# Patient Record
Sex: MALE | NOT HISPANIC OR LATINO | URBAN - METROPOLITAN AREA
[De-identification: names, ages, dates, MRNs, and addresses within clinical notes are randomized per-mention and may not be internally consistent; named-entity substitution may affect disease eponyms.]

---

## 2024-01-20 ENCOUNTER — TRANSFERRED RECORDS (OUTPATIENT)
Dept: HEALTH INFORMATION MANAGEMENT | Facility: CLINIC | Age: 69
End: 2024-01-20

## 2024-04-10 ENCOUNTER — TRANSFERRED RECORDS (OUTPATIENT)
Dept: HEALTH INFORMATION MANAGEMENT | Facility: CLINIC | Age: 69
End: 2024-04-10

## 2024-09-03 ENCOUNTER — TRANSFERRED RECORDS (OUTPATIENT)
Dept: HEALTH INFORMATION MANAGEMENT | Facility: CLINIC | Age: 69
End: 2024-09-03

## 2024-09-04 ENCOUNTER — TRANSCRIBE ORDERS (OUTPATIENT)
Dept: OTHER | Age: 69
End: 2024-09-04

## 2024-09-04 DIAGNOSIS — I50.20 UNSPECIFIED SYSTOLIC (CONGESTIVE) HEART FAILURE (H): Primary | ICD-10-CM

## 2024-09-05 ENCOUNTER — TELEPHONE (OUTPATIENT)
Dept: CARDIOLOGY | Facility: CLINIC | Age: 69
End: 2024-09-05
Payer: MEDICARE

## 2024-09-05 DIAGNOSIS — I42.8 NONISCHEMIC CARDIOMYOPATHY (H): ICD-10-CM

## 2024-09-05 DIAGNOSIS — I26.94 MULTIPLE SUBSEGMENTAL PULMONARY EMBOLI WITHOUT ACUTE COR PULMONALE (H): ICD-10-CM

## 2024-09-05 DIAGNOSIS — I50.22 CHRONIC SYSTOLIC CONGESTIVE HEART FAILURE (H): Primary | ICD-10-CM

## 2024-09-05 DIAGNOSIS — I47.29 NONSUSTAINED VENTRICULAR TACHYCARDIA (H): ICD-10-CM

## 2024-09-05 DIAGNOSIS — Z99.81 DEPENDENCE ON SUPPLEMENTAL OXYGEN: ICD-10-CM

## 2024-09-05 NOTE — TELEPHONE ENCOUNTER
[facility] Call [facility]    Phone Message    May a detailed message be left on voicemail: yes     Reason for Call: Appointment Intake    Referring Provider Name: VALENTINA FLOWER   Diagnosis and/or Symptoms:     Unspecified systolic (congestive) heart failure (H) [I50.20]     NEW HF. Please assist patient.     Action Taken: Message routed to:  [facility] Cardio    Travel Screening: Not Applicable     Date of Service:

## 2024-09-05 NOTE — TELEPHONE ENCOUNTER
9/5/24 - Received referral from [facility] in [place].  As [facility] is not on [facility], will have records scanned into [facility].    9/9/24 - Reviewed records.  Pt lives in [place] but travels to [place] frequently as he works for [employer] in [employer].  Approximately one year ago pt was diagnosed with dilated cardiomyopathy, pulmonary embolism. Latest EF < 30% (9/3/24).  Pt is being referred for advanced options.    9/10/24 - Called/LVM  9/12/24 - Called/LVM

## 2024-09-09 PROBLEM — I47.29 NONSUSTAINED VENTRICULAR TACHYCARDIA (H): Status: ACTIVE | Noted: 2024-09-09

## 2024-09-09 PROBLEM — I26.94 MULTIPLE SUBSEGMENTAL PULMONARY EMBOLI WITHOUT ACUTE COR PULMONALE (H): Status: ACTIVE | Noted: 2024-09-09

## 2024-09-09 PROBLEM — I42.8 NONISCHEMIC CARDIOMYOPATHY (H): Status: ACTIVE | Noted: 2024-09-09

## 2024-09-09 PROBLEM — I50.22 CHRONIC SYSTOLIC CONGESTIVE HEART FAILURE (H): Status: ACTIVE | Noted: 2024-09-09

## 2024-09-09 PROBLEM — Z99.81 DEPENDENCE ON SUPPLEMENTAL OXYGEN: Status: ACTIVE | Noted: 2024-09-09

## 2024-09-09 RX ORDER — HYDROCODONE BITARTRATE AND ACETAMINOPHEN 5; 325 MG/1; MG/1
1 TABLET ORAL EVERY 8 HOURS PRN
COMMUNITY

## 2024-09-09 RX ORDER — ZOLPIDEM TARTRATE 5 MG/1
5 TABLET ORAL
COMMUNITY

## 2024-09-09 RX ORDER — CARVEDILOL 6.25 MG/1
6.25 TABLET ORAL 2 TIMES DAILY WITH MEALS
COMMUNITY

## 2024-09-09 RX ORDER — FUROSEMIDE 40 MG
40 TABLET ORAL DAILY
COMMUNITY

## 2024-09-09 RX ORDER — SPIRONOLACTONE 25 MG/1
12.5 TABLET ORAL DAILY
COMMUNITY

## 2024-09-09 RX ORDER — LOSARTAN POTASSIUM 25 MG/1
25 TABLET ORAL DAILY
COMMUNITY

## 2024-09-19 NOTE — TELEPHONE ENCOUNTER
September 19, 2024 - called patient, no answer, left voicemail. Closing referral but will reopen if patient calls back

## 2024-09-30 ENCOUNTER — CARE COORDINATION (OUTPATIENT)
Dept: CARDIOLOGY | Facility: CLINIC | Age: 69
End: 2024-09-30
Payer: MEDICARE

## 2024-09-30 ENCOUNTER — TELEPHONE (OUTPATIENT)
Dept: CARDIOLOGY | Facility: CLINIC | Age: 69
End: 2024-09-30
Payer: MEDICARE

## 2024-09-30 NOTE — TELEPHONE ENCOUNTER
This patient called and left a voicemail stating he was calling back to schedule his new HF referral.    Zonia Calvillo  Clinic    Pulmonary Hypertension   [facility]  (p) [phone]

## 2024-10-08 NOTE — PROGRESS NOTES
Received referral from [facility] in [place].  As [facility] is not on [facility], will have records scanned into [employer].     Reviewed records. Pt lives in [place] but travels to [place] frequently as he works for [employer] in [employer].  Approximately one year ago pt was diagnosed with dilated cardiomyopathy, pulmonary embolism. Latest EF < 30% (9/3/24).  Pt is being referred for advanced options.    Spoke with patient.He had not returned our calls as he recently moved in [place] and found that he has a lot of dropped calls and did not get the message. He is interested in seeing HF provider  Offered appt with Dr. Lala early November.      I will send his records to scanning but will also email them to Dr Lala prior to his visit.

## 2024-11-04 DIAGNOSIS — I21.A1 MYOCARDIAL INFARCTION TYPE 2 (H): ICD-10-CM

## 2024-11-04 DIAGNOSIS — I42.8 NICM (NONISCHEMIC CARDIOMYOPATHY) (H): ICD-10-CM

## 2024-11-04 DIAGNOSIS — I50.20 HEART FAILURE WITH REDUCED EJECTION FRACTION, NYHA CLASS III (H): Primary | ICD-10-CM

## 2024-11-04 DIAGNOSIS — I47.20 VENTRICULAR TACHYCARDIA, UNSPECIFIED (H): ICD-10-CM

## 2024-11-05 ENCOUNTER — CARE COORDINATION (OUTPATIENT)
Dept: CARDIOLOGY | Facility: CLINIC | Age: 69
End: 2024-11-05
Payer: MEDICARE

## 2024-11-05 NOTE — PROGRESS NOTES
Pt called in today.  He has a 1:00 appt with Dr. Lala.  He came down from [place] and arrived at daughter's home but needed to go to the ER with his grandson.  As the patient will be having knee surgery early in December, he elected to postpone his appt with us until early January.

## 2025-01-14 ENCOUNTER — CARE COORDINATION (OUTPATIENT)
Dept: CARDIOLOGY | Facility: CLINIC | Age: 70
End: 2025-01-14

## 2025-01-14 NOTE — PROGRESS NOTES
Patient was initially referred by [facility] [place] in Sept/2024 for advanced HF options. He was scheduled for consult Dec/2024 but needed to cancel that appt due to his schedule conflict.  He was scheduled again in Jan/2025 but forgot about the appt and didn't show up for the appt. He reported he's had a lot medical appts done and lost track of his appt information.    1/14/25 - Called/LVM to call us to reschedule.  1/15/25 - Pt called back.  Offered appt in 3 weeks but he needs an appt later in the month.  Offered appt with Dr. Pablo on 2/19.      Records are scanned as [facility] is not on [facility]. Pt lives in [place] but travels to [place] frequently as he works for [employer] in [employer]. Approximately one year ago pt was diagnosed with dilated cardiomyopathy, pulmonary embolism. Latest EF < 30% (9/3/24).

## 2025-02-17 DIAGNOSIS — I50.22 CHRONIC SYSTOLIC CONGESTIVE HEART FAILURE (H): Primary | ICD-10-CM

## 2025-02-18 NOTE — PROGRESS NOTES
Advanced Heart Failure/Transplant Clinic Note    HPI  Dear colleagues,     I had the pleasure of seeing Mr. Yoni Bruner in the Cardiology clinic.  As you know, Mr. Yoni Bruner is a pleasant 69 year old male with a past medical history of chronic HFrEF 2/2 NICM, PE, and NSVT who presents as new referral for HFrEF.    Mr Bruner is a 69 year old gentleman from [place] who had no significant medical history until January 2024. He began feeling short of breath in December 2023 and was eventually diagnosed with a pulmonary embolism in January of 2024. He had an echocardigram done at that time which showed an LVEF of 30%. He did undergo cardiac catheterization which showed non-obstructive disease. He was put on GDMT and his echocardiogram was repeated in September 2024. Unfortunately his LVEF had declined further. He had an ICD placed and was referred to us for advanced therapies evaluation.     From a functional standpoint, he is NYHA Class II. He denies SOB on exertion, PND, orthopnea and pedal edema. He lives on his own and is able to carry out all his ADLs. His limitation to his exercise tolerance was arthritis of his right knee but he has undergone TKR in December and is recovering well. He is very active and regularly does work around his house and in his garden. He does not check his blood pressures at home but denies any light-headedness or syncope. His ICD has never shocked him. He has 4 daughters who live in the [facility]. He has not had genetic testing done as yet.      ROS:  A complete 12-point ROS was negative except as above.    PAST MEDICAL HISTORY:  Patient Active Problem List   Diagnosis    Chronic systolic congestive heart failure (H)    Multiple subsegmental pulmonary emboli without acute cor pulmonale (H)    Nonsustained ventricular tachycardia (H)    Dependence on supplemental oxygen    Nonischemic cardiomyopathy (H)      FAMILY HISTORY:  No family history on file.    SOCIAL HISTORY:  No  smoking, no alcohol or recreational drug use.   Social History     Socioeconomic History    Marital status: Single     Spouse name: Not on file    Number of children: Not on file    Years of education: Not on file    Highest education level: Not on file   Occupational History    Not on file   Tobacco Use    Smoking status: Former     Types: Cigarettes    Smokeless tobacco: Never    Tobacco comments:     Some in college, per ptStacie Hightower, EMT on 2/19/2025 at 1:18 PM    Substance and Sexual Activity    Alcohol use: Not on file    Drug use: Not on file    Sexual activity: Not on file   Other Topics Concern    Not on file   Social History Narrative    Not on file     Social Drivers of Health     Financial Resource Strain: Medium Risk (3/8/2021)    Received from [facility]    Overall Financial Resource Strain (CARDIA)     Difficulty of Paying Living Expenses: Somewhat hard   Food Insecurity: No Food Insecurity (3/8/2021)    Received from [facility]    Hunger Vital Sign     Worried About Running Out of Food in the Last Year: Never true     Ran Out of Food in the Last Year: Never true   Transportation Needs: No Transportation Needs (3/8/2021)    Received from [facility] and [facility]    PRAPARE - Transportation     Lack of Transportation (Medical): No     Lack of Transportation (Non-Medical): No   Physical Activity: Not on file   Stress: Not on file   Social Connections: Not on file   Interpersonal Safety: Not on file   Housing Stability: Not on file       ALLERGIES:  No Known Allergies    CURRENT MEDICATIONS:  Current Outpatient Medications   Medication Sig Dispense Refill    apixaban ANTICOAGULANT (ELIQUIS) 5 MG tablet Take 5 mg by mouth 2 times daily.      carvedilol (COREG) 6.25 MG tablet Take 6.25 mg by mouth 2 times daily (with meals).      empagliflozin (JARDIANCE) 10 MG TABS tablet Take 10 mg by mouth daily.       "furosemide (LASIX) 40 MG tablet Take 40 mg by mouth daily.      HYDROcodone-acetaminophen (NORCO) 5-325 MG tablet Take 1 tablet by mouth every 8 hours as needed for moderate to severe pain.      spironolactone (ALDACTONE) 25 MG tablet Take 12.5 mg by mouth daily.      zolpidem (AMBIEN) 5 MG tablet Take 5 mg by mouth nightly as needed for sleep.      losartan (COZAAR) 25 MG tablet Take 2 tablets (50 mg) by mouth daily. 60 tablet 1       EXAM:  /80 (BP Location: Right arm, Patient Position: Chair, Cuff Size: Adult Regular)   Pulse 92   Wt 66.2 kg (145 lb 14.4 oz)   SpO2 99%     General: appears comfortable, alert and interactive, in no acute distress  Head: normocephalic, atraumatic  Eyes: anicteric sclera, EOMI  Mouth: MMM  Neck: supple, no cervical adenopathy  CV: regular rate and rhythm, no murmur, gallop, rub, estimated JVP ~7-8 cm  Resp: clear, no rales or wheezing  GI: soft, nontender, nondistended  Extremities: warm, no peripheral edema, 2+ bilateral radial pulses  Neurological: alert and oriented, no focal deficits  Psych: normal mood and affect  Derm: no rashes on exposed surfaces    Weight  Wt Readings from Last 10 Encounters:   02/19/25 66.2 kg (145 lb 14.4 oz)       I personally reviewed recent labs and data as below and discussed the results with the patient in clinic today.  Labs:  CBC RESULTS:  No results found for: \"WBC\", \"RBC\", \"HGB\", \"HCT\", \"MCV\", \"MCH\", \"MCHC\", \"RDW\", \"PLT\"    CMP RESULTS:  Lab Results   Component Value Date     02/19/2025    POTASSIUM 4.0 02/19/2025    CHLORIDE 106 02/19/2025    CO2 28 02/19/2025    ANIONGAP 7 02/19/2025     (H) 02/19/2025    BUN 10.5 02/19/2025    CR 0.98 02/19/2025    GFRESTIMATED 84 02/19/2025    NEIDA 9.0 02/19/2025    BILITOTAL 0.2 02/19/2025    ALBUMIN 4.3 02/19/2025    ALKPHOS 70 02/19/2025    ALT 18 02/19/2025    AST 21 02/19/2025      Recent Labs   Lab Test 02/19/25  1503   CHOL 184   HDL 63   LDL 90   TRIG 155*    "     Testing/Procedures:  I personally visualized and interpreted:  TTE 9/3/24      EKG 1/20/24 shows sinus, QRS 90ms     Outside results of note:  Outside records were obtained and relevant results/notes have been incorporated into HPI.    Assessment and Plan:     In summary, Mr Bruner is 69 year old male with a past medical history of chronic HFrEF 2/2 NICM, PE, and NSVT who presents as new referral for HFrEF.    Chronic systolic heart failure/HFrEF (EF <30%) secondary to nonischemic cardiomyopathy  NYHA Symptom Class II  ACC/AHA Stage C  ACE-I/ARB/ARNi: Increase losartan to 50 mg daily, switch to Entresto soon if able  BB: Continue carvedilol 6.25 mg BID, increase as able  Aldosterone antagonist: Continue spironolactone 12.5 mg daily  SGLT2i: Continue empagliflozin 10 mg daily  SCD prophylaxis: s/p ICD  %BiV pacing: N/A  Fluid status: euvolemic on lasix 40 mg daily  Remote PA Pressure Monitoring (CardioMems) Deferred while medical therapy is optimized  Advanced Therapies Consideration: No indications at this time  - Will continue to optimize GDMT and obtain cMRI once optimized  - Referral for genetic testing  - Once optimized will obtain RHC and CPX to establish baseline    NSVT s/p ICD  - Follows with local EP team    PE  - Continue apixaban 5 mg BID (discussed importance of taking this BID)    Optimal Vascular Metrics    Blood Pressure   BP < 140/90 Yes    On Aspirin  No: Contraindicated due to: Already on DOAC    On Statin  No: Contraindicated due to: LDL <100 without    Tobacco use  No       To Do:  - Increase Losartan to 50 mg daily, switch to Entresto soon if able  - Labs in one week   - Continue to optimize GDMT and obtain cMRI once optimal  - Genetic counseling referral  - Follow up with me in 6 weeks     The patient states understanding and is agreeable with plan.   Feel free to contact myself regarding questions or concerns. It was a pleasure to see this patient today.    A total of 87 minutes was spent  on the day of the visit, which includes preparation for the visit (reviewing previous medical records, laboratories and investigations), in conjunction with the actual clinic visit with the patient, which includes obtaining a history and physical exam, creating and reviewing the care plan, patient education (and family if present), counseling, documenting clinical information in the electronic health record and care coordination.     The longitudinal plan of care for the diagnosis(es)/condition(s) as documented were addressed during this visit. Due to the added complexity in care, I will continue to support Yoni in the subsequent management and with ongoing continuity of care.     Mali Pablo MD   of Medicine, [facility]  Advanced Heart Failure and Transplant Cardiology     CC  Anoop العراقي

## 2025-02-19 ENCOUNTER — OFFICE VISIT (OUTPATIENT)
Dept: CARDIOLOGY | Facility: CLINIC | Age: 70
End: 2025-02-19
Attending: STUDENT IN AN ORGANIZED HEALTH CARE EDUCATION/TRAINING PROGRAM
Payer: MEDICARE

## 2025-02-19 ENCOUNTER — CARE COORDINATION (OUTPATIENT)
Dept: CARDIOLOGY | Facility: CLINIC | Age: 70
End: 2025-02-19

## 2025-02-19 ENCOUNTER — LAB (OUTPATIENT)
Dept: LAB | Facility: CLINIC | Age: 70
End: 2025-02-19
Payer: COMMERCIAL

## 2025-02-19 ENCOUNTER — TELEPHONE (OUTPATIENT)
Dept: CARDIOLOGY | Facility: CLINIC | Age: 70
End: 2025-02-19

## 2025-02-19 VITALS
WEIGHT: 145.9 LBS | OXYGEN SATURATION: 99 % | DIASTOLIC BLOOD PRESSURE: 80 MMHG | SYSTOLIC BLOOD PRESSURE: 131 MMHG | HEART RATE: 92 BPM

## 2025-02-19 DIAGNOSIS — I50.22 CHRONIC SYSTOLIC CONGESTIVE HEART FAILURE (H): Primary | ICD-10-CM

## 2025-02-19 DIAGNOSIS — I42.8 NICM (NONISCHEMIC CARDIOMYOPATHY) (H): ICD-10-CM

## 2025-02-19 DIAGNOSIS — I26.94 MULTIPLE SUBSEGMENTAL PULMONARY EMBOLI WITHOUT ACUTE COR PULMONALE (H): ICD-10-CM

## 2025-02-19 DIAGNOSIS — I42.8 NONISCHEMIC CARDIOMYOPATHY (H): ICD-10-CM

## 2025-02-19 DIAGNOSIS — I47.29 NSVT (NONSUSTAINED VENTRICULAR TACHYCARDIA) (H): ICD-10-CM

## 2025-02-19 DIAGNOSIS — I50.22 CHRONIC SYSTOLIC CONGESTIVE HEART FAILURE (H): ICD-10-CM

## 2025-02-19 DIAGNOSIS — I50.20 HEART FAILURE WITH REDUCED EJECTION FRACTION, NYHA CLASS III (H): ICD-10-CM

## 2025-02-19 LAB
ALBUMIN SERPL BCG-MCNC: 4.3 G/DL (ref 3.5–5.2)
ALP SERPL-CCNC: 70 U/L (ref 40–150)
ALT SERPL W P-5'-P-CCNC: 18 U/L (ref 0–70)
ANION GAP SERPL CALCULATED.3IONS-SCNC: 7 MMOL/L (ref 7–15)
AST SERPL W P-5'-P-CCNC: 21 U/L (ref 0–45)
BILIRUB SERPL-MCNC: 0.2 MG/DL
BUN SERPL-MCNC: 10.5 MG/DL (ref 8–23)
CALCIUM SERPL-MCNC: 9 MG/DL (ref 8.8–10.4)
CHLORIDE SERPL-SCNC: 106 MMOL/L (ref 98–107)
CHOLEST SERPL-MCNC: 184 MG/DL
CREAT SERPL-MCNC: 0.98 MG/DL (ref 0.67–1.17)
EGFRCR SERPLBLD CKD-EPI 2021: 84 ML/MIN/1.73M2
FASTING STATUS PATIENT QL REPORTED: NO
FASTING STATUS PATIENT QL REPORTED: NO
GLUCOSE SERPL-MCNC: 109 MG/DL (ref 70–99)
HCO3 SERPL-SCNC: 28 MMOL/L (ref 22–29)
HDLC SERPL-MCNC: 63 MG/DL
LDLC SERPL CALC-MCNC: 90 MG/DL
NONHDLC SERPL-MCNC: 121 MG/DL
NT-PROBNP SERPL-MCNC: 4348 PG/ML (ref 0–900)
POTASSIUM SERPL-SCNC: 4 MMOL/L (ref 3.4–5.3)
PROT SERPL-MCNC: 7 G/DL (ref 6.4–8.3)
SODIUM SERPL-SCNC: 141 MMOL/L (ref 135–145)
TRIGL SERPL-MCNC: 155 MG/DL

## 2025-02-19 PROCEDURE — 83880 ASSAY OF NATRIURETIC PEPTIDE: CPT | Performed by: PATHOLOGY

## 2025-02-19 PROCEDURE — 36415 COLL VENOUS BLD VENIPUNCTURE: CPT | Performed by: PATHOLOGY

## 2025-02-19 PROCEDURE — 80053 COMPREHEN METABOLIC PANEL: CPT | Performed by: PATHOLOGY

## 2025-02-19 PROCEDURE — G0463 HOSPITAL OUTPT CLINIC VISIT: HCPCS | Performed by: STUDENT IN AN ORGANIZED HEALTH CARE EDUCATION/TRAINING PROGRAM

## 2025-02-19 PROCEDURE — 80061 LIPID PANEL: CPT | Performed by: PATHOLOGY

## 2025-02-19 RX ORDER — LOSARTAN POTASSIUM 25 MG/1
50 TABLET ORAL DAILY
Qty: 60 TABLET | Refills: 1 | Status: SHIPPED | OUTPATIENT
Start: 2025-02-19

## 2025-02-19 ASSESSMENT — PAIN SCALES - GENERAL: PAINLEVEL_OUTOF10: NO PAIN (0)

## 2025-02-19 NOTE — LETTER
2/19/2025      RE: Yoni Bruner  [address]  [facility] [address]       Dear Colleague,    Thank you for the opportunity to participate in the care of your patient, Yoni Bruner, at the [facility] [place] [facility]. Please see a copy of my visit note below.    Advanced Heart Failure/Transplant Clinic Note    HPI  Dear colleagues,     I had the pleasure of seeing Mr. Yoni Bruner in the Cardiology clinic.  As you know, Mr. Yoni Bruner is a pleasant 69 year old male with a past medical history of chronic HFrEF 2/2 NICM, PE, and NSVT who presents as new referral for HFrEF.    Mr Bruner is a 69 year old gentleman from [place] who had no significant medical history until January 2024. He began feeling short of breath in December 2023 and was eventually diagnosed with a pulmonary embolism in January of 2024. He had an echocardigram done at that time which showed an LVEF of 30%. He did undergo cardiac catheterization which showed non-obstructive disease. He was put on GDMT and his echocardiogram was repeated in September 2024. Unfortunately his LVEF had declined further. He had an ICD placed and was referred to us for advanced therapies evaluation.     From a functional standpoint, he is NYHA Class II. He denies SOB on exertion, PND, orthopnea and pedal edema. He lives on his own and is able to carry out all his ADLs. His limitation to his exercise tolerance was arthritis of his right knee but he has undergone TKR in December and is recovering well. He is very active and regularly does work around his house and in his garden. He does not check his blood pressures at home but denies any light-headedness or syncope. His ICD has never shocked him. He has 4 daughters who live in the [facility]. He has not had genetic testing done as yet.      ROS:  A complete 12-point ROS was negative except as above.    PAST MEDICAL HISTORY:  Patient  Active Problem List   Diagnosis     Chronic systolic congestive heart failure (H)     Multiple subsegmental pulmonary emboli without acute cor pulmonale (H)     Nonsustained ventricular tachycardia (H)     Dependence on supplemental oxygen     Nonischemic cardiomyopathy (H)      FAMILY HISTORY:  No family history on file.    SOCIAL HISTORY:  No smoking, no alcohol or recreational drug use.   Social History     Socioeconomic History     Marital status: Single     Spouse name: Not on file     Number of children: Not on file     Years of education: Not on file     Highest education level: Not on file   Occupational History     Not on file   Tobacco Use     Smoking status: Former     Types: Cigarettes     Smokeless tobacco: Never     Tobacco comments:     Some in college, per ptStacie Hightower, EMT on 2/19/2025 at 1:18 PM    Substance and Sexual Activity     Alcohol use: Not on file     Drug use: Not on file     Sexual activity: Not on file   Other Topics Concern     Not on file   Social History Narrative     Not on file     Social Drivers of Health     Financial Resource Strain: Medium Risk (3/8/2021)    [facility]    Overall Financial Resource Strain (CARDIA)      Difficulty of Paying Living Expenses: Somewhat hard   Food Insecurity: No Food Insecurity (3/8/2021)    Received from [facility]    Hunger Vital Sign      Worried About Running Out of Food in the Last Year: Never true      Ran Out of Food in the Last Year: Never true   Transportation Needs: No Transportation Needs (3/8/2021)    Received from [facility]    PRAPARE - Transportation      Lack of Transportation (Medical): No      Lack of Transportation (Non-Medical): No   Physical Activity: Not on file   Stress: Not on file   Social Connections: Not on file   Interpersonal Safety: Not on file   Housing Stability: Not on file  "      ALLERGIES:  No Known Allergies    CURRENT MEDICATIONS:  Current Outpatient Medications   Medication Sig Dispense Refill     apixaban ANTICOAGULANT (ELIQUIS) 5 MG tablet Take 5 mg by mouth 2 times daily.       carvedilol (COREG) 6.25 MG tablet Take 6.25 mg by mouth 2 times daily (with meals).       empagliflozin (JARDIANCE) 10 MG TABS tablet Take 10 mg by mouth daily.       furosemide (LASIX) 40 MG tablet Take 40 mg by mouth daily.       HYDROcodone-acetaminophen (NORCO) 5-325 MG tablet Take 1 tablet by mouth every 8 hours as needed for moderate to severe pain.       spironolactone (ALDACTONE) 25 MG tablet Take 12.5 mg by mouth daily.       zolpidem (AMBIEN) 5 MG tablet Take 5 mg by mouth nightly as needed for sleep.       losartan (COZAAR) 25 MG tablet Take 2 tablets (50 mg) by mouth daily. 60 tablet 1       EXAM:  /80 (BP Location: Right arm, Patient Position: Chair, Cuff Size: Adult Regular)   Pulse 92   Wt 66.2 kg (145 lb 14.4 oz)   SpO2 99%     General: appears comfortable, alert and interactive, in no acute distress  Head: normocephalic, atraumatic  Eyes: anicteric sclera, EOMI  Mouth: MMM  Neck: supple, no cervical adenopathy  CV: regular rate and rhythm, no murmur, gallop, rub, estimated JVP ~7-8 cm  Resp: clear, no rales or wheezing  GI: soft, nontender, nondistended  Extremities: warm, no peripheral edema, 2+ bilateral radial pulses  Neurological: alert and oriented, no focal deficits  Psych: normal mood and affect  Derm: no rashes on exposed surfaces    Weight  Wt Readings from Last 10 Encounters:   02/19/25 66.2 kg (145 lb 14.4 oz)       I personally reviewed recent labs and data as below and discussed the results with the patient in clinic today.  Labs:  CBC RESULTS:  No results found for: \"WBC\", \"RBC\", \"HGB\", \"HCT\", \"MCV\", \"MCH\", \"MCHC\", \"RDW\", \"PLT\"    CMP RESULTS:  Lab Results   Component Value Date     02/19/2025    POTASSIUM 4.0 02/19/2025    CHLORIDE 106 02/19/2025    CO2 28 " 02/19/2025    ANIONGAP 7 02/19/2025     (H) 02/19/2025    BUN 10.5 02/19/2025    CR 0.98 02/19/2025    GFRESTIMATED 84 02/19/2025    NEIDA 9.0 02/19/2025    BILITOTAL 0.2 02/19/2025    ALBUMIN 4.3 02/19/2025    ALKPHOS 70 02/19/2025    ALT 18 02/19/2025    AST 21 02/19/2025      Recent Labs   Lab Test 02/19/25  1503   CHOL 184   HDL 63   LDL 90   TRIG 155*        Testing/Procedures:  I personally visualized and interpreted:  TTE 9/3/24      EKG 1/20/24 shows sinus, QRS 90ms     Outside results of note:  Outside records were obtained and relevant results/notes have been incorporated into HPI.    Assessment and Plan:     In summary, Mr Bruner is 69 year old male with a past medical history of chronic HFrEF 2/2 NICM, PE, and NSVT who presents as new referral for HFrEF.    Chronic systolic heart failure/HFrEF (EF <30%) secondary to nonischemic cardiomyopathy  NYHA Symptom Class II  ACC/AHA Stage C  ACE-I/ARB/ARNi: Increase losartan to 50 mg daily, switch to Entresto soon if able  BB: Continue carvedilol 6.25 mg BID, increase as able  Aldosterone antagonist: Continue spironolactone 12.5 mg daily  SGLT2i: Continue empagliflozin 10 mg daily  SCD prophylaxis: s/p ICD  %BiV pacing: N/A  Fluid status: euvolemic on lasix 40 mg daily  Remote PA Pressure Monitoring (CardioMems) Deferred while medical therapy is optimized  Advanced Therapies Consideration: No indications at this time  - Will continue to optimize GDMT and obtain cMRI once optimized  - Referral for genetic testing  - Once optimized will obtain RHC and CPX to establish baseline    NSVT s/p ICD  - Follows with local EP team    PE  - Continue apixaban 5 mg BID (discussed importance of taking this BID)    Optimal Vascular Metrics    Blood Pressure   BP < 140/90 Yes    On Aspirin  No: Contraindicated due to: Already on DOAC    On Statin  No: Contraindicated due to: LDL <100 without    Tobacco use  No       To Do:  - Increase Losartan to 50 mg daily, switch to  Entresto soon if able  - Labs in one week   - Continue to optimize GDMT and obtain cMRI once optimal  - Genetic counseling referral  - Follow up with me in 6 weeks     The patient states understanding and is agreeable with plan.   Feel free to contact myself regarding questions or concerns. It was a pleasure to see this patient today.    A total of 87 minutes was spent on the day of the visit, which includes preparation for the visit (reviewing previous medical records, laboratories and investigations), in conjunction with the actual clinic visit with the patient, which includes obtaining a history and physical exam, creating and reviewing the care plan, patient education (and family if present), counseling, documenting clinical information in the electronic health record and care coordination.     The longitudinal plan of care for the diagnosis(es)/condition(s) as documented were addressed during this visit. Due to the added complexity in care, I will continue to support Yoni in the subsequent management and with ongoing continuity of care.     Mali Pablo MD   of Medicine, [facility]  Advanced Heart Failure and Transplant Cardiology     CC  Anoop العراقي         Please do not hesitate to contact me if you have any questions/concerns.     Sincerely,     Mali Pablo MD

## 2025-02-19 NOTE — NURSING NOTE
Chief Complaint   Patient presents with    New Patient     NEW HFL: 69 year old male presents as referral from [facility] for dilated CM, EF <30% to establish care and discuss advanced heart failure with labs prior       Vitals were taken, medications reconciled.    Sandoval Hightower, EMT    1:23 PM

## 2025-02-19 NOTE — NURSING NOTE
Diet: Patient instructed regarding a heart failure healthy diet, including discussion of reduced fat and 2000 mg daily sodium restriction, daily weights, medication purpose and compliance, fluid restrictions and resources for patient and family to access for assistance with heart failure management.       Labs: Patient was given results of the laboratory testing obtained today and patient was instructed about when to return for the next laboratory testing. Labs drawn after clinic.     Med Reconcile: Reviewed and verified all current medications with the patient. The updated medication list was printed and given to the patient. No changes for now.     Return Appointment: Patient given instructions regarding scheduling next clinic visit. Cardiac MRI with contrast. Genetic counseling referral. RTC In 6 weeks with dr donaldson.     Patient stated he understood all health information given and agreed to call with further questions or concerns.     Josi Peters RN

## 2025-02-19 NOTE — PROGRESS NOTES
Date: 2/19/2025    Time of Call: 3:52 PM     Diagnosis:  heart failure     [ VORB ] Ordering provider: Mali Pablo MD    Order: increase losartan to 50mg once daily. Repeat labs in 1 week.      Order received by: Josi Peters RN      Follow-up/additional notes: Called Yoni. Reviewed above. He states understanding and confirms local lab for lab recheck.

## 2025-02-19 NOTE — PATIENT INSTRUCTIONS
Cardiology Providers you saw during your visit:  Dr. Anne     Medication changes:  1- no changes right now.  *Please take your eliquis 5mg two times daily.         Follow up:  1- Labs today before you go  2 - Cardiac MRI  3 - Follow up with Dr Anne in 6 weeks  4 - Genetic counseling referral       Please call if you have:  1. Weight gain of more than 2 pounds in a day or 5 pounds in a week  2. Increased shortness of breath, swelling or bloating  3. Dizziness, lightheadedness   4. Any questions or concerns.      Heart Failure Support Group  Support group is held virtually. Please reach out if you would like to attend and we can get you the information you need to log in.     Follow the American Heart Association Diet and Lifestyle recommendations:  Limit saturated fat, trans fat, sodium, red meat, sweets and sugar-sweetened beverages. If you choose to eat red meat, compare labels and select the leanest cuts available.  Aim for at least 150 minutes of moderate physical activity or 75 minutes of vigorous physical activity - or an equal combination of both - each week.     During business hours: [phone], press option # 1 to schedule an appointment or send a message to your care team     After hours, weekends or holidays: On Call Cardiologist- [phone]   option #4 and ask to speak to the on-call Cardiologist. Inform them you are a CORE/heart failure patient at the [facility].     Josi Peters RN BSN  Cardiology Nurse Care Coordinator (Heart Failure / C.O.R.E.)

## 2025-02-19 NOTE — TELEPHONE ENCOUNTER
30 days supply test claims requested for the drugs below:  Entresto 49-51mg BID  - Patient only has Part B benefits, no pharmacy benefits found            GoodRx coupon is $737.30 for 60 tablets

## 2025-03-16 ENCOUNTER — HEALTH MAINTENANCE LETTER (OUTPATIENT)
Age: 70
End: 2025-03-16

## 2025-05-20 ENCOUNTER — TELEPHONE (OUTPATIENT)
Dept: CARDIOLOGY | Facility: CLINIC | Age: 70
End: 2025-05-20
Payer: COMMERCIAL

## 2025-05-20 NOTE — TELEPHONE ENCOUNTER
Left Voicemail (1st Attempt) and Sent Mychart (1st Attempt) for the patient to call back and schedule the following:    Appointment type: Return Heart Failure  Provider: Dr. Pablo  Return date: next available  Specialty phone number: [phone] opt 1  Additional appointment(s) needed: Labs  Additonal Notes: NA

## 2025-05-22 NOTE — TELEPHONE ENCOUNTER
Left Voicemail (2nd Attempt) for the patient to call back and schedule the following:    Appointment type: Return Heart Failure  Provider: Dr. Pablo  Return date: next available  Specialty phone number: [phone] opt 1  Additional appointment(s) needed: Labs  Additonal Notes: NA